# Patient Record
Sex: MALE | Race: WHITE | Employment: OTHER | URBAN - METROPOLITAN AREA
[De-identification: names, ages, dates, MRNs, and addresses within clinical notes are randomized per-mention and may not be internally consistent; named-entity substitution may affect disease eponyms.]

---

## 2020-01-15 ENCOUNTER — NURSE NAVIGATOR (OUTPATIENT)
Dept: OTHER | Age: 60
End: 2020-01-15

## 2020-01-15 NOTE — NURSE NAVIGATOR
Referring Provider: Jam Dugan MD      Lung Cancer Risk Profile:   Age: 62  Gender: Male  Height: 72\"  Weight: 199#    Smoking History:  Smoking Status: current use  # years smokin  # years quit: 0  Packs/day: 1  Pack years: 55    Patient discussed smoking cessation with PCP: Yes, per patient report    Patient participated in shared decision making process with PCP: Unknown    Patient is currently experiencing symptoms: No, per patient report    If yes what symptoms:     Co-Morbidities:      Cancer History:      Additional Risk Factors:        Aunt with lung cancer, exposure to second hand smoke      Patient's smoking history discussed via phone. Patient meets LDCT lung cancer screening criteria.  Call transferred to central scheduling to schedule exam.      CRISTAL SalasN, RN, [address] [address] [facility] Nurse Navigator

## 2020-01-22 ENCOUNTER — HOSPITAL ENCOUNTER (OUTPATIENT)
Dept: CT IMAGING | Age: 60
Discharge: HOME | End: 2020-01-22
Attending: FAMILY MEDICINE
Payer: COMMERCIAL

## 2020-01-22 VITALS — HEIGHT: 72 IN | BODY MASS INDEX: 26.82 KG/M2 | WEIGHT: 198 LBS

## 2020-01-22 DIAGNOSIS — F17.210 NICOTINE DEPENDENCE, CIGARETTES, UNCOMPLICATED: ICD-10-CM

## 2020-01-22 PROCEDURE — G0297 LDCT FOR LUNG CA SCREEN: HCPCS

## 2021-07-13 ENCOUNTER — OFFICE VISIT (OUTPATIENT)
Dept: ORTHOPEDIC SURGERY | Age: 61
End: 2021-07-13
Payer: COMMERCIAL

## 2021-07-13 VITALS — HEIGHT: 72 IN | BODY MASS INDEX: 25.47 KG/M2 | WEIGHT: 188 LBS

## 2021-07-13 DIAGNOSIS — M25.562 LEFT KNEE PAIN, UNSPECIFIED CHRONICITY: Primary | ICD-10-CM

## 2021-07-13 DIAGNOSIS — M17.12 OSTEOARTHRITIS OF LEFT KNEE, UNSPECIFIED OSTEOARTHRITIS TYPE: ICD-10-CM

## 2021-07-13 PROCEDURE — 20611 DRAIN/INJ JOINT/BURSA W/US: CPT | Performed by: ORTHOPAEDIC SURGERY

## 2021-07-13 PROCEDURE — 99203 OFFICE O/P NEW LOW 30 MIN: CPT | Performed by: ORTHOPAEDIC SURGERY

## 2021-07-13 RX ORDER — GUAIFENESIN 100 MG/5ML
81 LIQUID (ML) ORAL DAILY
COMMUNITY

## 2021-07-13 RX ORDER — TRIAMCINOLONE ACETONIDE 40 MG/ML
40 INJECTION, SUSPENSION INTRA-ARTICULAR; INTRAMUSCULAR ONCE
Status: COMPLETED | OUTPATIENT
Start: 2021-07-13 | End: 2021-07-13

## 2021-07-13 RX ORDER — LIDOCAINE HYDROCHLORIDE 10 MG/ML
9 INJECTION INFILTRATION; PERINEURAL ONCE
Status: COMPLETED | OUTPATIENT
Start: 2021-07-13 | End: 2021-07-13

## 2021-07-13 RX ADMIN — LIDOCAINE HYDROCHLORIDE 9 ML: 10 INJECTION INFILTRATION; PERINEURAL at 10:15

## 2021-07-13 RX ADMIN — TRIAMCINOLONE ACETONIDE 40 MG: 40 INJECTION, SUSPENSION INTRA-ARTICULAR; INTRAMUSCULAR at 10:15

## 2021-07-13 NOTE — LETTER
Kelly Blood   1960   [id number]       7/13/2021       I hereby authorize and direct Brooks Jacobsen MD, Vimal Correa, and whomever he may designate as his associate to perform upon myself the following procedure:    Injection of: Kenalog, Supartz, Euflexxa, Orthovisc in the Right/Left ____________________. If any unforeseen condition arises in the course of the procedure, I further authorize him and his associated and/or assistant(s) to do whatever he/she deems advisable. The nature, purpose, benefits, risks, side effects, likelihood of achieving goals, and potential problems that might occur during recuperation, risks for not receiving the proposed care, treatment and services and alternatives of the procedure have been fully explained to me by my physician including, but not limited to:    Swelling, joint pain, skin pigment changes, worsening of condition, and failure to improve. I acknowledge that no guarantee or assurance has been made to me as to the results that may be obtained or the likelihood of success.                 _______________________________________     Signature of patient or authorized representative                [employer] and [facility] fax: [phone]

## 2021-07-13 NOTE — PROGRESS NOTES
Name: Juju Kelsey    : 1960     Service Dept: [address] [place] [address] and Sports Medicine    Patient's Pharmacies:    [id number] [address] [facility], [address] [facility]  [address]  [place] [address]  Phone: [phone] Fax: [phone]       Chief Complaint   Patient presents with    Knee Pain        Visit Vitals  Ht 6' (1.829 m)   Wt 188 lb (85.3 kg)   BMI 25.50 kg/m²      No Known Allergies   Current Outpatient Medications   Medication Sig Dispense Refill    aspirin 81 mg chewable tablet Take 81 mg by mouth daily. There is no problem list on file for this patient. History reviewed. No pertinent family history. Social History     Socioeconomic History    Marital status: SINGLE     Spouse name: Not on file    Number of children: Not on file    Years of education: Not on file    Highest education level: Not on file   Tobacco Use    Smoking status: Current Every Day Smoker    Smokeless tobacco: Never Used   Substance and Sexual Activity    Alcohol use: Yes     Social Determinants of Health     Financial Resource Strain:     Difficulty of Paying Living Expenses:    Food Insecurity:     Worried About Running Out of Food in the Last Year:     [address] in the Last Year:    Transportation Needs:     Lack of Transportation (Medical):  Lack of Transportation (Non-Medical):    Physical Activity:     Days of Exercise per Week:     Minutes of Exercise per Session:    Stress:     Feeling of Stress :    Social Connections:     Frequency of Communication with Friends and Family:     Frequency of Social Gatherings with Friends and Family:     Attends [employer] Services:     Active Member of Clubs or Organizations:     Attends Club or Organization Meetings:     Marital Status:       History reviewed. No pertinent surgical history. History reviewed. No pertinent past medical history.      I have reviewed and agree with [address] and ROS and intake form in chart and the record furthermore I have reviewed prior medical record(s) regarding this patients care during this appointment. Review of Systems:   Patient is a pleasant appearing individual, appropriately dressed, well hydrated, well nourished, who is alert, appropriately oriented for age, and in no acute distress with a normal gait and normal affect who does not appear to be in any significant pain. Physical Exam:  Left Knee -Decrease range of motion with flexion, Knee arc of greater than 50 degrees, Some crepitation, Grossly neurovascularly intact, Good cap refill, No skin lesion, Moderate swelling, No gross instability, Some quadriceps weakness, Kellgren and Rakesh at least grade 3    Right Knee - Full Range of Motion, No crepitation, Grossly neurovascularly intact, Good cap refill, No skin lesion, No swelling, No gross instability, No quadriceps weakness    Procedure Documentation:    I discussed in detail the risks, benefits and complications of an injection which included but are not limited to infection, skin reactions, hot swollen joint, and anaphylaxis with the patient. The patient verbalized understanding and gave informed consent for the injection. The patient's knee was flexed to 90° and the skin prepped using sterile alcohol solution. A sterile needle was inserted into the left knee and the mixture of 9 mL Lidocaine 1%, 1 mL Kenalog 40 mg was injected under sterile technique. The needle was withdrawn and the puncture site sealed with a Band-Aid. Technique: Under sterile conditions a [employer] ultrasound unit with a variable frequency (7.0-14.0 MHz) linear transducer was used to localize the placement of needle into the left knee joint. Findings: Successful needle placement for knee injection. Final images were taken and saved for permanent record. The patient tolerated the injection well.  The patient was instructed to call the office immediately if there is any pain, redness, warmth, fever, or chills. Encounter Diagnoses     ICD-10-CM ICD-9-CM   1. Left knee pain, unspecified chronicity  M25.562 719.46   2. Osteoarthritis of left knee, unspecified osteoarthritis type  M17.12 715.96       HPI:  The patient is here with a chief complaint of left knee pain, throbbing, burning pain, progressively getting worse. Pain is 6/10. X-rays are positive for severe OA. Assessment/Plan:  Plan for left knee cortisone injection today. See the patient back in 2 weeks. If no better, we will consider treatment options and go from there. As part of continued conservative pain management options the patient was advised to utilize Tylenol or OTC NSAIDS as long as it is not medically contraindicated. Return to Office: Follow-up and Dispositions    · Return in about 2 weeks (around 7/27/2021). Administrations This Visit     lidocaine (XYLOCAINE) 10 mg/mL (1 %) injection 9 mL     Admin Date  07/13/2021 Action  Given Dose  9 mL Route  Other Administered By  Sulema Pena LPN          triamcinolone acetonide (KENALOG-40) 40 mg/mL injection 40 mg     Admin Date  07/13/2021 Action  Given Dose  40 mg Route  Intra artICUlar Administered By  Sulema Pena LPN               Scribed by Pepito Berrios LPN as dictated by [facility] CHRIS Gillette MD.  Documentation True and Accepted Brooks Gillette MD

## 2021-07-13 NOTE — PATIENT INSTRUCTIONS
Knee Pain or Injury: Care Instructions  Your Care Instructions     Injuries are a common cause of knee problems. Sudden (acute) injuries may be caused by a direct blow to the knee. They can also be caused by abnormal twisting, bending, or falling on the knee. Pain, bruising, or swelling may be severe, and may start within minutes of the injury. Overuse is another cause of knee pain. Other causes are climbing stairs, kneeling, and other activities that use the knee. Everyday wear and tear, especially as you get older, also can cause knee pain. Rest, along with home treatment, often relieves pain and allows your knee to heal. If you have a serious knee injury, you may need tests and treatment. Follow-up care is a key part of your treatment and safety. Be sure to make and go to all appointments, and call your doctor if you are having problems. It's also a good idea to know your test results and keep a list of the medicines you take. How can you care for yourself at home? · Be safe with medicines. Read and follow all instructions on the label. ? If the doctor gave you a prescription medicine for pain, take it as prescribed. ? If you are not taking a prescription pain medicine, ask your doctor if you can take an over-the-counter medicine. · Rest and protect your knee. Take a break from any activity that may cause pain. · Put ice or a cold pack on your knee for 10 to 20 minutes at a time. Put a thin cloth between the ice and your skin. · Prop up a sore knee on a pillow when you ice it or anytime you sit or lie down for the next 3 days. Try to keep it above the level of your heart. This will help reduce swelling. · If your knee is not swollen, you can put moist heat, a heating pad, or a warm cloth on your knee. · If your doctor recommends an elastic bandage, sleeve, or other type of support for your knee, wear it as directed.   · Follow your doctor's instructions about how much weight you can put on your leg. Use a cane, crutches, or a walker as instructed. · Follow your doctor's instructions about activity during your healing process. If you can do mild exercise, slowly increase your activity. · Reach and stay at a healthy weight. Extra weight can strain the joints, especially the knees and hips, and make the pain worse. Losing even a few pounds may help. When should you call for help? Call 911 anytime you think you may need emergency care. For example, call if:    · You have symptoms of a blood clot in your lung (called a pulmonary embolism). These may include:  ? Sudden chest pain. ? Trouble breathing. ? Coughing up blood. Call your doctor now or seek immediate medical care if:    · You have severe or increasing pain.     · Your leg or foot turns cold or changes color.     · You cannot stand or put weight on your knee.     · Your knee looks twisted or bent out of shape.     · You cannot move your knee.     · You have signs of infection, such as:  ? Increased pain, swelling, warmth, or redness. ? Red streaks leading from the knee. ? Pus draining from a place on your knee. ? A fever.     · You have signs of a blood clot in your leg (called a deep vein thrombosis), such as:  ? Pain in your calf, back of the knee, thigh, or groin. ? Redness and swelling in your leg or groin. Watch closely for changes in your health, and be sure to contact your doctor if:    · You have tingling, weakness, or numbness in your knee.     · You have any new symptoms, such as swelling.     · You have bruises from a knee injury that last longer than 2 weeks.     · You do not get better as expected. Where can you learn more? Go to http://www.gray.com/  Enter K195 in the search box to learn more about \"Knee Pain or Injury: Care Instructions. \"  Current as of: February 26, 2020               Content Version: 12.8  © 5132-6996 [employer].    Care instructions adapted under license by [employer] (which disclaims liability or warranty for this information). If you have questions about a medical condition or this instruction, always ask your healthcare professional. [address] any warranty or liability for your use of this information.

## 2021-07-27 ENCOUNTER — OFFICE VISIT (OUTPATIENT)
Dept: ORTHOPEDIC SURGERY | Age: 61
End: 2021-07-27
Payer: COMMERCIAL

## 2021-07-27 DIAGNOSIS — M17.12 PRIMARY OSTEOARTHRITIS OF LEFT KNEE: Primary | ICD-10-CM

## 2021-07-27 PROCEDURE — 99213 OFFICE O/P EST LOW 20 MIN: CPT | Performed by: ORTHOPAEDIC SURGERY

## 2021-07-27 NOTE — PATIENT INSTRUCTIONS
Knee Arthritis: Care Instructions  Your Care Instructions     Knee arthritis is a breakdown of the cartilage that cushions your knee joint. When the cartilage wears down, your bones rub against each other. This causes pain and stiffness. Knee arthritis tends to get worse with time. Treatment for knee arthritis involves reducing pain, making the leg muscles stronger, and staying at a healthy body weight. The treatment usually does not improve the health of the cartilage, but it can reduce pain and improve how well your knee works. You can take simple measures to protect your knee joints, ease your pain, and help you stay active. Follow-up care is a key part of your treatment and safety. Be sure to make and go to all appointments, and call your doctor if you are having problems. It's also a good idea to know your test results and keep a list of the medicines you take. How can you care for yourself at home? · Know that knee arthritis will cause more pain on some days than on others. · Stay at a healthy weight. Lose weight if you are overweight. When you stand up, the pressure on your knees from every pound of body weight is multiplied four times. So if you lose 10 pounds, you will reduce the pressure on your knees by 40 pounds. · Talk to your doctor or physical therapist about exercises that will help ease joint pain. ? Stretch to help prevent stiffness and to prevent injury before you exercise. You may enjoy gentle forms of yoga to help keep your knee joints and muscles flexible. ? Walk instead of jog.  ? Ride a bike. This makes your thigh muscles stronger and takes pressure off your knee. ? Wear well-fitting and comfortable shoes. ? Exercise in chest-deep water. This can help you exercise longer with less pain. ? Avoid exercises that include squatting or kneeling. They can put a lot of strain on your knees.   ? Talk to your doctor to make sure that the exercise you do is not making the arthritis worse.  · Do not sit for long periods of time. Try to walk once in a while to keep your knee from getting stiff. · Ask your doctor or physical therapist whether shoe inserts may reduce your arthritis pain. · If you can afford it, get new athletic shoes at least every year. This can help reduce the strain on your knees. · Use a device to help you do everyday activities. ? A cane or walking stick can help you keep your balance when you walk. Hold the cane or walking stick in the hand opposite the painful knee. ? If you feel like you may fall when you walk, try using crutches or a front-wheeled walker. These can prevent falls that could cause more damage to your knee. ? A knee brace may help keep your knee stable and prevent pain. ? You also can use other things to make life easier, such as a higher toilet seat and handrails in the bathtub or shower. · Take pain medicines exactly as directed. ? Do not wait until you are in severe pain. You will get better results if you take it sooner. ? If you are not taking a prescription pain medicine, take an over-the-counter medicine such as acetaminophen (Tylenol), ibuprofen (Advil, Motrin), or naproxen (Aleve). Read and follow all instructions on the label. ? Do not take two or more pain medicines at the same time unless the doctor told you to. Many pain medicines have acetaminophen, which is Tylenol. Too much acetaminophen (Tylenol) can be harmful. ? Tell your doctor if you take a blood thinner, have diabetes, or have allergies to shellfish. · Ask your doctor if you might benefit from a shot of steroid medicine into your knee. This may provide pain relief for several months. · Many people take the supplements glucosamine and chondroitin for osteoarthritis. Some people feel they help, but the medical research does not show that they work. Talk to your doctor before you take these supplements. When should you call for help?    Call your doctor now or seek immediate medical care if:    · You have sudden swelling, warmth, or pain in your knee.     · You have knee pain and a fever or rash.     · You have such bad pain that you cannot use your knee. Watch closely for changes in your health, and be sure to contact your doctor if you have any problems. Where can you learn more? Go to http://www.gray.com/  Enter W187 in the search box to learn more about \"Knee Arthritis: Care Instructions. \"  Current as of: August 5, 2020               Content Version: 12.8  © 2006-2021 [employer]. Care instructions adapted under license by [employer] (which disclaims liability or warranty for this information). If you have questions about a medical condition or this instruction, always ask your healthcare professional. [address] any warranty or liability for your use of this information.

## 2021-07-27 NOTE — PROGRESS NOTES
Name: Kelly Blood    : 1960     Service Dept: [address] [place] [address] and Sports Medicine    Patient's Pharmacies:    [id number] [address] [place] [address] - [address]  [address]  [place] [address]  Phone: [phone] Fax: [phone]       Chief Complaint   Patient presents with    Knee Pain        There were no vitals taken for this visit. No Known Allergies   Current Outpatient Medications   Medication Sig Dispense Refill    aspirin 81 mg chewable tablet Take 81 mg by mouth daily. There is no problem list on file for this patient. No family history on file. Social History     Socioeconomic History    Marital status: SINGLE     Spouse name: Not on file    Number of children: Not on file    Years of education: Not on file    Highest education level: Not on file   Tobacco Use    Smoking status: Current Every Day Smoker    Smokeless tobacco: Never Used   Substance and Sexual Activity    Alcohol use: Yes     Social Determinants of Health     Financial Resource Strain:     Difficulty of Paying Living Expenses:    Food Insecurity:     Worried About Running Out of Food in the Last Year:     [address] in the Last Year:    Transportation Needs:     Lack of Transportation (Medical):  Lack of Transportation (Non-Medical):    Physical Activity:     Days of Exercise per Week:     Minutes of Exercise per Session:    Stress:     Feeling of Stress :    Social Connections:     Frequency of Communication with Friends and Family:     Frequency of Social Gatherings with Friends and Family:     Attends Nondenominational Services:     Active Member of Clubs or Organizations:     Attends Club or Organization Meetings:     Marital Status:       No past surgical history on file. No past medical history on file.      I have reviewed and agree with PFSH and ROS and intake form in chart and the record furthermore I have reviewed prior medical record(s) regarding this patients care during this appointment. Review of Systems:   Patient is a pleasant appearing individual, appropriately dressed, well hydrated, well nourished, who is alert, appropriately oriented for age, and in no acute distress with a normal gait and normal affect who does not appear to be in any significant pain. Physical Exam:  Left Knee -Decrease range of motion with flexion, Knee arc of greater than 50 degrees, Some crepitation, Grossly neurovascularly intact, Good cap refill, No skin lesion, Moderate swelling, No gross instability, Some quadriceps weakness, Kellgren and Rakesh at least grade 3    Right Knee - Full Range of Motion, No crepitation, Grossly neurovascularly intact, Good cap refill, No skin lesion, No swelling, No gross instability, No quadriceps weakness     Encounter Diagnoses     ICD-10-CM ICD-9-CM   1. Primary osteoarthritis of left knee  M17.12 715.16       HPI:  The patient is here with a chief complaint of left knee pain, throbbing, burning pain, post-cortisone injection diagnosis severe OA. Pain is 3/10. Assessment/Plan:  Plan at this point is for left total knee replacement if he decides, but for right now  I will see him back as needed. He will need general medical clearance if he decides and go from there. As part of continued conservative pain management options the patient was advised to utilize Tylenol or OTC NSAIDS as long as it is not medically contraindicated. Return to Office: Follow-up and Dispositions    · Return for PRN. Scribed by Vignesh Aguilar as dictated by Noe Rudolph. Efrem Davis MD.  Documentation True and Accepted Brooks Davis MD

## 2021-08-09 ENCOUNTER — TELEPHONE (OUTPATIENT)
Dept: ORTHOPEDIC SURGERY | Age: 61
End: 2021-08-09

## 2021-08-09 DIAGNOSIS — M17.12 OSTEOARTHRITIS OF LEFT KNEE, UNSPECIFIED OSTEOARTHRITIS TYPE: ICD-10-CM

## 2021-08-09 DIAGNOSIS — M25.562 LEFT KNEE PAIN, UNSPECIFIED CHRONICITY: Primary | ICD-10-CM

## 2021-08-09 DIAGNOSIS — M25.562 LEFT KNEE PAIN, UNSPECIFIED CHRONICITY: ICD-10-CM

## 2021-08-09 NOTE — TELEPHONE ENCOUNTER
Patient would like to schedule:     Plan at this point is for left total knee replacement if he decides, but for right now  I will see him back as needed. He will need general medical clearance if he decides and go from there.

## 2022-02-28 ENCOUNTER — HOSPITAL ENCOUNTER (OUTPATIENT)
Dept: LAB | Age: 62
Discharge: HOME | End: 2022-02-28

## 2022-11-23 ENCOUNTER — HOSPITAL ENCOUNTER (OUTPATIENT)
Dept: LAB | Age: 62
Discharge: HOME | End: 2022-11-23

## 2022-11-23 ENCOUNTER — HOSPITAL ENCOUNTER (OUTPATIENT)
Dept: NON INVASIVE DIAGNOSTICS | Age: 62
Discharge: HOME | End: 2022-11-23
Payer: COMMERCIAL

## 2022-11-23 ENCOUNTER — TRANSCRIBE ORDER (OUTPATIENT)
Dept: REGISTRATION | Age: 62
End: 2022-11-23

## 2022-11-23 DIAGNOSIS — Z01.812 BLOOD TESTS PRIOR TO TREATMENT OR PROCEDURE: ICD-10-CM

## 2022-11-23 DIAGNOSIS — M17.12 DEGENERATIVE ARTHRITIS OF LEFT KNEE: ICD-10-CM

## 2022-11-23 DIAGNOSIS — M17.12 DEGENERATIVE ARTHRITIS OF LEFT KNEE: Primary | ICD-10-CM

## 2022-11-23 LAB
ATRIAL RATE: 77 BPM
CALCULATED P AXIS, ECG09: 75 DEGREES
CALCULATED R AXIS, ECG10: 68 DEGREES
CALCULATED T AXIS, ECG11: -115 DEGREES
DIAGNOSIS, 93000: NORMAL
P-R INTERVAL, ECG05: 126 MS
Q-T INTERVAL, ECG07: 376 MS
QRS DURATION, ECG06: 80 MS
QTC CALCULATION (BEZET), ECG08: 425 MS
VENTRICULAR RATE, ECG03: 77 BPM
XX-LABCORP SPECIMEN COL,LCBCF: NORMAL

## 2022-11-23 PROCEDURE — 93005 ELECTROCARDIOGRAM TRACING: CPT

## 2022-11-23 PROCEDURE — 99001 SPECIMEN HANDLING PT-LAB: CPT

## 2023-01-03 ENCOUNTER — HOSPITAL ENCOUNTER (OUTPATIENT)
Dept: PHYSICAL THERAPY | Age: 63
End: 2023-01-03
Payer: COMMERCIAL

## 2023-01-03 PROCEDURE — 97162 PT EVAL MOD COMPLEX 30 MIN: CPT

## 2023-01-03 NOTE — THERAPY EVALUATION
PT KNEE EVAL AND DAILY NOTE 10-18    Patient Name: Payal Guillermo  Date:1/3/2023  : 1960  [x]  Patient  Verified  Payor: /    In time: 7048  Out time: 0845  Total Treatment Time (min): 35  Visit #: 1    Medicare/BCBS Only   Total Timed Codes (min):  0 1:1 Treatment Time:  35     Treatment Area: Status post total left knee replacement [Z96.652]    SUBJECTIVE  Pain Level (0-10 scale): Current: 2/10; At worst: 5/10   []Sharp  [x]Dull  [x]Achy []Burning []Throbbing []N&T []Other:  []constant [x]intermittent []improving []worsening []no change since onset      Any medication changes, allergies to medications, adverse drug reactions, diagnosis change, or new procedure performed?: [x] No    [] Yes (see summary sheet for update)  Subjective:     Pt is a 66 y/o male who presents to physical therapy s/p L TKA on 22. Pt received in waiting area ambulating with no AD with antalgic gait pattern of decreased gait speed, decreased estephania, and decreased R step length. Prior to surgery pt was independent with mobility and used no AD for ambulation. Pt presents today with decreased ROM, decreased strength, increased pain, and decreased ability to ambulate with normalized gait pattern. Pt could benefit from skilled PT services to address the above impairments and to improve Pt ability to participate in functional activities of choice.      Date of Surgery: 22  Weight bearing status: WBAT    Patient Goals: Reduce pain, restore mobility  Previous Treatment/Compliance: home health PT for 2-3 weeks following surgery  Barriers: [x]pain []financial []time []transportation []other  Motivation: Good  Substance use: []Alcohol []Tobacco []other:   LEFS Score: 56.2  FOTO: 65  30STS: 10 reps      OBJECTIVE/EXAMINATION  Posture: [] Varus    [] Valgus    [] Recurvatum        [] Tibial Torsion    [] Foot Supination    [] Foot Pronation    Describe:    Gait:  [] Normal    [] Abnormal    [x] Antalgic    [] NWB    Device: none    Describe: decreased gait speed, decreased estephania, and decreased R step length    ROM / Strength  [] Unable to assess                  AROM                      PROM                   Strength (1-5)    Left Right Left Right Left Right   Hip Flexion WNL WNL   4+/5 4+/5    Extension WNL WNL   4+/5 5/5    Abduction WNL WNL   4+/5 5/5    Adduction WNL WNL   5/5 5/5   Knee Flexion 108  110  4/5 5/5    Extension 0 0   4+/5 5/5   Ankle Plantarflexion WNL WNL   4+/5 5/5    Dorsiflexion WNL WNL   4+/5 5/5       Flexibility: [] Unable to assess at this time  Hamstrings:    (L) Tightness= [] WNL   [] Min   [] Mod   [] Severe    (R) Tightness= [] WNL   [] Min   [] Mod   [] Severe  Quadriceps:    (L) Tightness= [] WNL   [] Min   [x] Mod   [] Severe    (R) Tightness= [] WNL   [] Min   [] Mod   [] Severe  Gastroc:      (L) Tightness= [] WNL   [] Min   [] Mod   [] Severe    (R) Tightness= [] WNL   [] Min   [] Mod   [] Severe  Other:    Palpation:  TTP at distal quadriceps    Optional Tests:  Patellar Positioning (Static)   []L []R Normal []L []R Lateral   []L []R Xochitl Genera      []L []R Medial   []L []R [employer] AT [place]    Patellar Tracking   []L []R Glide (Lat)   []L []R Tilt (Lat)     []L []R Glide (Med)  []L []R Tilt (Med)      []L []R Tile (Inf)     Patellar Mobility   []L []R Hypermobile []L []R Hypomobile         Girth Measurements:   Mid patellar (cm) 10 cm proximal to superior border of patella (cm) 10 cm inferior to inferior pole of patella (cm)            Special testing:  Lachmans  [] Neg    [] Pos Posterior Drawer [] Neg    [] Pos  Pivot Shift  [] Neg    [] Pos Posterior Sag  [] Neg    [] Pos  CHRIS   [] Neg    [] Pos Brayden's Test [] Neg    [] Pos  ALRI   [] Neg    [] Pos Squat   [] Neg    [] Pos  Valgus@ 0 Degrees [] Neg    [] Pos Linda [] Neg    [] Pos  Valgus@ 30 Degrees [] Neg    [] Pos Patellar Apprehension [] Neg    [] Pos  Varus@ 0 Degrees [] Neg    [] Pos Montoya's Compression [] Neg    [] Pos  Varus@ 30 Degrees [] Neg    [] Pos Ely's Test  [] Neg    [] Pos  Apley's Compression [] Neg    [] Pos Chente's Test  [] Neg    [] Pos  Apley's Distraction [] Neg    [] Pos Stroke Test  [] Neg    [] Pos   Anterior Drawer [] Neg    [] Pos Fluctuation Test [] Neg    [] Pos  Other:                  [] Neg    [] Pos                 Other tests/comments:  Mobility: independent  Self Care: independent        Modality rationale:    Min Type Additional Details    [] Estim:  []Unatt       []IFC  []Premod                        []Other:  []w/ice   []w/heat  Position:  Location:    [] Estim: []Att    []TENS instruct  []NMES                    []Other:  []w/US   []w/ice   []w/heat  Position:  Location:    []  Traction: [] Cervical       []Lumbar                       [] Prone          []Supine                       []Intermittent   []Continuous Lbs:  [] before manual  [] after manual    []  Ultrasound: []Continuous   [] Pulsed                           []1MHz   []3MHz Location:  W/cm2:    []  Iontophoresis with dexamethasone         Location: [] Take home patch   [] In clinic    []  Ice     []  heat  []  Ice massage  []  Laser   []  Anodyne Position:  Location:    []  Laser with stim  []  Other: Position:  Location:    []  Vasopneumatic Device Pressure:       [] lo [] med [] hi   Temperature: [] lo [] med [] hi   [] Skin assessment post-treatment:  []intact []redness- no adverse reaction    []redness - adverse reaction:     35 min [x]Eval                  []Re-Eval             With   [] TE   [] TA   [] neuro   [] other: Patient Education: [x] Review HEP    [] Progressed/Changed HEP based on:   [] positioning   [] body mechanics   [] transfers   [] heat/ice application    [] other:        Pain Level (0-10 scale) post treatment: 2/10      Assessment:    [x]  See Plan of Care  []  See progress note/recertification  []  See Discharge Summary           Yolanda Sanz PT, DPT  1/3/2023  8:11 AM

## 2023-01-03 NOTE — THERAPY EVALUATION
[facility] Dorcas Montalvo, [address] [facility], [address] [place] [address]  VIKY Mus[address]  [address]  FOR PHYSICAL THERAPY SERVICES  Patient Name: Es Fair : 1960   Medical   Diagnosis: Status post total left knee replacement [Z96.652] Treatment Diagnosis: R26.2; M62.81   Onset Date: 22     Referral Source: Karin Pastor MD [facility], [place], operated by [facility]): 1/3/2023   Prior Hospitalization: See medical history Provider #: [id number]   Prior Level of Function: Independent   Comorbidities: L TKA   Medications: Verified on Patient Summary List   The Plan of Care and following information is based on the information from the initial evaluation.   ==========================================================================================  Assessment / Functional Analysis:    Pt is a 66 y/o male who presents to physical therapy s/p L TKA on 22. Pt received in waiting area ambulating with no AD with antalgic gait pattern of decreased gait speed, decreased estephania, and decreased R step length. Prior to surgery pt was independent with mobility and used no AD for ambulation. Pt presents today with decreased ROM, decreased strength, increased pain, and decreased ability to ambulate with normalized gait pattern.  Pt could benefit from skilled PT services to address the above impairments and to improve Pt ability to participate in functional activities of choice.     ==========================================================================================  Eval Complexity: History: MEDIUM  Complexity : 1-2 comorbidities / personal factors will impact the outcome/ POC Exam:LOW Complexity : 1-2 Standardized tests and measures addressing body structure, function, activity limitation and / or participation in recreation  Presentation: LOW Complexity : Stable, uncomplicated  Clinical Decision Making:MEDIUM Complexity : FOTO score of 26-74Overall Complexity:MEDIUM    Problem List: pain affecting function, decrease ROM, decrease strength, edema affecting function, impaired gait/ balance, decrease ADL/ functional abilitiies, decrease activity tolerance, decrease flexibility/ joint mobility, and decrease transfer abilities   Treatment Plan may include any combination of the following: Therapeutic exercise, Neuromuscular reeducation, Manual therapy, Therapeutic activity, Self care/home management, Electric stim unattended , Vasopneumatic device, and Gait training  Patient / Family readiness to learn indicated by: asking questions, trying to perform skills, and interest  Persons(s) to be included in education: patient (P)  Barriers to Learning/Limitations: None      Patient self reported health status: good  Rehabilitation Potential: excellent    Objective Measures:  LEFS Score: 56.2  FOTO: 65  30STS: 10 reps    ROM / Strength  [] Unable to assess                  AROM                      PROM                   Strength (1-5)      Left Right Left Right Left Right   Hip Flexion WNL WNL     4+/5 4+/5     Extension WNL WNL     4+/5 5/5     Abduction WNL WNL     4+/5 5/5     Adduction WNL WNL     5/5 5/5   Knee Flexion 108 WNL  110   4/5 5/5     Extension 0 0     4+/5 5/5   Ankle Plantarflexion WNL WNL     4+/5 5/5     Dorsiflexion WNL WNL     4+/5 5/5      Short Term Goals: 3 weeks  Patient will report the knowledge of 3 exercises that can be used to help reduce symptoms to be able to ind reduce symptoms while at home. Patient will demonstrate a 1/2 grade improvement in LLE MMT to be able to better ambulate with a normalized gait without pain. Patient will demonstrate L knee PROM of 0-115 deg to facilitate increased ability to ascend/descend stairs. Patient will increase LEFS > 10 points to facilitate increased ability to perform functional activities of choice.     Long Term Goals: 6 weeks  Patient will demonstrate a 2 grade improvement in LLE MMT to be able to better ambulate with a normalized gait without pain. Patient will demonstrate L knee AROM of 0-125  deg or greater to be able to return to normal ambulation on level and unlevel surfaces. Patient will demonstrate the ability to ambulate > 500 feet without an AD without evidence of antalgia to be able to return to an ind walking program following discharge. Patient will increase LEFS > 20 points to facilitate increased ability to perform leisure activities of choice. Frequency / Duration: Patient to be seen  2  times per week for 6  weeks:  Patient / Caregiver education and instruction: self care, activity modification, brace/ splint application, and exercises    Therapist Signature: Amber Garber PT, DPT Date: 8/0/1581   Certification Period: 1/3/23 - 4/3/23 Time: 9:20 AM   ===========================================================================================  I certify that the above Physical Therapy Services are being furnished while the patient is under my care. I agree with the treatment plan and certify that this therapy is necessary. Physician Signature:        Date:       Time:     Please sign and return to [facility] [facility] or you may fax the signed copy to [phone]. Please call [phone] if more information required. Thank you.

## 2023-01-09 ENCOUNTER — APPOINTMENT (OUTPATIENT)
Dept: PHYSICAL THERAPY | Age: 63
End: 2023-01-09
Payer: COMMERCIAL

## 2023-01-09 PROCEDURE — 97110 THERAPEUTIC EXERCISES: CPT

## 2023-01-09 NOTE — PROGRESS NOTES
PT DAILY TREATMENT NOTE     Patient Name: Junito Prow  Date:2023  : 1960  [x]  Patient  Verified  Payor: Lianet Dukedolores / Plan: [address] [place] [address] [employer] / Product Type: HMO /    In time:0800  Out BHDR:[id number]  Total Treatment Time (min): 48  Total Timed Codes (min): 48  1:1 Treatment Time (min): 48   Visit #: 2    Treatment Area: Status post total left knee replacement [Z96.652]    SUBJECTIVE  Pt reports minor soreness and stiffness in L knee. Pain Level (0-10 scale): 0    Any medication changes, allergies to medications, adverse drug reactions, diagnosis change, or new procedure performed?: [x] No    [] Yes (see summary sheet for update)    OBJECTIVE  Modality rationale: Declined   Min Type Additional Details    [] Estim: []Att   []Unatt  []TENS instruct                 []IFC  []Premod []NMES                       []Other:  []w/US   []w/ice   []w/heat  Position:  Location:    []  Traction: [] Cervical       []Lumbar                       [] Prone          []Supine                       []Intermittent   []Continuous Lbs:  [] before manual  [] after manual    []  Ultrasound: []Continuous   [] Pulsed                           []1MHz   []3MHz Location:  W/cm2:    []  Ice     []  heat  []  Ice massage Position:  Location:    []  Vasopneumatic Device Pressure: [] lo [] med [] hi   Temp: [] lo [] med [] hi   [] Skin assessment post-treatment:  []intact []redness- no adverse reaction       []redness - adverse reaction:     48 min Therapeutic Exercise:  [] See flow sheet :   Rationale: increase ROM, increase strength, improve coordination, improve balance, and increase proprioception to improve the patients ability to return to PLOF with full AROM and strength.            With TE  TA   NR  GT   Misc Patient Education: [x] Review HEP    [] Progressed/Changed HEP based on:   [] positioning   [] body mechanics   [] transfers   [] heat/ice application        Pain Level (0-10 scale) post treatment: 0    ASSESSMENT/Changes in Function: Initiated POC working to address range of motion and strength deficits in L knee. Session began with an active warm up via stationary bicycle. Pt completed full revolutions on first attempt. B LE stretches followed with demonstration provided to ensure proper form. Focused on strengthening quadrecip with CKC and [place] exercises. Introduced gym equipment which patient completed without issue. All modalities declined. Plan to continue POC progressing as able. Patient will continue to benefit from skilled PT services to modify and progress therapeutic interventions, address functional mobility deficits, address ROM deficits, address strength deficits, analyze and address soft tissue restrictions, analyze and cue movement patterns, analyze and modify body mechanics/ergonomics, assess and modify postural abnormalities, and address imbalance/dizziness to attain remaining goals.      [x]  See Plan of Care  []  See progress note/recertification  []  See Discharge Summary         PLAN  []  Upgrade activities as tolerated     [x]  Continue plan of care  []  Update interventions per flow sheet       []  Discharge due to:_  []  Other:    Vickey Gongora  1/9/2023  8:53 AM

## 2023-01-11 ENCOUNTER — APPOINTMENT (OUTPATIENT)
Dept: PHYSICAL THERAPY | Age: 63
End: 2023-01-11
Payer: COMMERCIAL

## 2023-01-11 PROCEDURE — 97110 THERAPEUTIC EXERCISES: CPT

## 2023-01-11 NOTE — PROGRESS NOTES
PT DAILY TREATMENT NOTE     Patient Name: Bia Carrizales  Date:2023  : 1960  [x]  Patient  Verified  Payor: Claire Mueller / Plan: [employer] / Product Type: HMO /    In time:0800  Out KURA:[id number]  Total Treatment Time (min): 42  Total Timed Codes (min): 42  1:1 Treatment Time (min): 42   Visit #: 3    Treatment Area: Status post total left knee replacement [Z96.652]    SUBJECTIVE  Pt reports stiffness in L quadriceps. Denies any pain. Pain Level (0-10 scale): 0    Any medication changes, allergies to medications, adverse drug reactions, diagnosis change, or new procedure performed?: [x] No    [] Yes (see summary sheet for update)    OBJECTIVE  Modality rationale: Declined   Min Type Additional Details    [] Estim: []Att   []Unatt  []TENS instruct                 []IFC  []Premod []NMES                       []Other:  []w/US   []w/ice   []w/heat  Position:  Location:    []  Traction: [] Cervical       []Lumbar                       [] Prone          []Supine                       []Intermittent   []Continuous Lbs:  [] before manual  [] after manual    []  Ultrasound: []Continuous   [] Pulsed                           []1MHz   []3MHz Location:  W/cm2:    []  Ice     []  heat  []  Ice massage Position:  Location:    []  Vasopneumatic Device Pressure: [] lo [] med [] hi   Temp: [] lo [] med [] hi   [] Skin assessment post-treatment:  []intact []redness- no adverse reaction       []redness - adverse reaction:     42 min Therapeutic Exercise:  [] See flow sheet :   Rationale: increase ROM, increase strength, improve coordination, improve balance, and increase proprioception to improve the patients ability to obtain full AROM and strength.             With TE  TA   NR  GT   Misc Patient Education: [x] Review HEP    [] Progressed/Changed HEP based on:   [] positioning   [] body mechanics   [] transfers   [] heat/ice application        Pain Level (0-10 scale) post treatment: 0    ASSESSMENT/Changes in Function: Session began with an active warm up followed by B LE stretches. Continued to address range of motion and strength deficits in L knee with a strength circuit. Introduced straight leg-raise with resistance. Pt was able to complete with no extensor lag present. Plan to incorporate balance training next visit. Plan to continue POC progressing within protocol. Patient will continue to benefit from skilled PT services to modify and progress therapeutic interventions, address functional mobility deficits, address ROM deficits, address strength deficits, analyze and address soft tissue restrictions, analyze and cue movement patterns, analyze and modify body mechanics/ergonomics, assess and modify postural abnormalities, and address imbalance/dizziness to attain remaining goals.      [x]  See Plan of Care  []  See progress note/recertification  []  See Discharge Summary         PLAN  []  Upgrade activities as tolerated     [x]  Continue plan of care  []  Update interventions per flow sheet       []  Discharge due to:_  []  Other:    Vickey Bobo  1/11/2023  8:47 AM

## 2023-01-16 ENCOUNTER — HOSPITAL ENCOUNTER (OUTPATIENT)
Dept: PHYSICAL THERAPY | Age: 63
Discharge: HOME | End: 2023-01-16
Payer: COMMERCIAL

## 2023-01-16 PROCEDURE — 97530 THERAPEUTIC ACTIVITIES: CPT

## 2023-01-16 PROCEDURE — 97110 THERAPEUTIC EXERCISES: CPT

## 2023-01-16 NOTE — PROGRESS NOTES
PT DAILY TREATMENT NOTE 8    Patient Name: Tri Hernadez  Date:2023  : 1960  [x]  Patient  Verified  Payor: Florentino Restrepo / Plan: [address] [place] [address] [employer] / Product Type: HMO /    In time: 8764  Out time: 935  Total Treatment Time (min): 57  Total Timed Codes (min): 57  1:1 Treatment Time (min): 57   Visit # 4      Treatment Area: Status post total left knee replacement [Z96.652]    SUBJECTIVE  Pt reports slight increase in L knee pain that he attributes to increased tightness from sitting a lot over the weekend.    Pain Level (0-10 scale): 0/10  Any medication changes, allergies to medications, adverse drug reactions, diagnosis change, or new procedure performed?: [x] No    [] Yes (see summary sheet for update)        OBJECTIVE  Modality rationale:    Min Type Additional Details    [] Estim: []Att   []Unatt  []TENS instruct                 []IFC  []Premod []NMES                       []Other:  []w/US   []w/ice   []w/heat  Position:  Location:    []  Traction: [] Cervical       []Lumbar                       [] Prone          []Supine                       []Intermittent   []Continuous Lbs:  [] before manual  [] after manual    []  Ultrasound: []Continuous   [] Pulsed                           []1MHz   []3MHz Location:  W/cm2:         []  Ice     []  heat  []  Ice massage Position:  Location:    []  Vasopneumatic Device Pressure: [] lo [] med [] hi   Temp: [] lo [] med [] hi   [] Skin assessment post-treatment:  []intact []redness- no adverse reaction       []redness - adverse reaction:       41 min Therapeutic Exercise:  [x] See flow sheet :   Rationale: increase ROM and increase strength to improve the patients ability to ambulate    16 min Therapeutic Activity:  []  See flow sheet :   Rationale: increase coordination to improve pt ability to transfer         With TE Patient Education: [x] Review HEP    [] Progressed/Changed HEP based on:   [] positioning   [] body mechanics   [] transfers   [] heat/ice application          Pain Level (0-10 scale) post treatment: 0/10    ASSESSMENT/Changes in Function:   Session initiated on recumbent bike followed by self stretching and PRE. Today's Tx session emphasized exercises per POC to increase L knee ROM and strength with Pt tolerating Tx with no adverse effects. Pt demonstrates increased activity tolerance when performing exercises during today's Tx session. Increased repetitions and resistance with SLR, leg press, and leg curl. PT will continue per POC, progressing as tolerated. Patient will continue to benefit from skilled PT services to modify and progress therapeutic interventions, address functional mobility deficits, address ROM deficits, address strength deficits, analyze and address soft tissue restrictions, analyze and cue movement patterns, analyze and modify body mechanics/ergonomics, assess and modify postural abnormalities, and address imbalance/dizziness to attain remaining goals.      []  See Plan of Care  []  See progress note/recertification  []  See Discharge Summary             PLAN  [x]  Upgrade activities as tolerated     [x]  Continue plan of care  [x]  Update interventions per flow sheet       []  Discharge due to:_  []  Other:_      Rian Damon PT, DPT 1/16/2023  10:19 AM

## 2023-01-18 ENCOUNTER — HOSPITAL ENCOUNTER (OUTPATIENT)
Dept: PHYSICAL THERAPY | Age: 63
Discharge: HOME | End: 2023-01-18
Payer: COMMERCIAL

## 2023-01-18 PROCEDURE — 97110 THERAPEUTIC EXERCISES: CPT

## 2023-01-18 NOTE — PROGRESS NOTES
PT DAILY TREATMENT NOTE     Patient Name: Smith Daft  Date:2023  : 1960  [x]  Patient  Verified  Payor: Julito Henrys / Plan: [employer] / Product Type: HMO /    In time:801  Out time:901  Total Treatment Time (min): 60  Total Timed Codes (min): 60  1:1 Treatment Time (min): 60   Visit #: 5     Treatment Area: Status post total left knee replacement [Z96.652]    SUBJECTIVE  Pt reports no issues concerning L knee. He says, \"my R knee is causing me more pain than my L. \"    Pain Level (0-10 scale): 0    Any medication changes, allergies to medications, adverse drug reactions, diagnosis change, or new procedure performed?: [x] No    [] Yes (see summary sheet for update)    OBJECTIVE  Modality rationale: Declined   Min Type Additional Details    [] Estim: []Att   []Unatt  []TENS instruct                 []IFC  []Premod []NMES                       []Other:  []w/US   []w/ice   []w/heat  Position:  Location:    []  Traction: [] Cervical       []Lumbar                       [] Prone          []Supine                       []Intermittent   []Continuous Lbs:  [] before manual  [] after manual    []  Ultrasound: []Continuous   [] Pulsed                           []1MHz   []3MHz Location:  W/cm2:    []  Ice     []  heat  []  Ice massage Position:  Location:    []  Vasopneumatic Device Pressure: [] lo [] med [] hi   Temp: [] lo [] med [] hi   [] Skin assessment post-treatment:  []intact []redness- no adverse reaction       []redness - adverse reaction:     60 min Therapeutic Exercise:  [] See flow sheet :   Rationale: increase ROM, increase strength, improve coordination, improve balance, and increase proprioception to improve the patients ability to achieve full AROM and strength allowing for ease with work and daily activities.             With TE  TA   NR  GT   Misc Patient Education: [x] Review HEP    [] Progressed/Changed HEP based on:   [] positioning   [] body mechanics   [] transfers   [] heat/ice application        Pain Level (0-10 scale) post treatment: 0    ASSESSMENT/Changes in Function: Session began with increased resistance on stationary bicycle followed by standing stretches. Continued with POC working to address range of motion and strength deficits in L knee. Revisited step downs to increase eccentric control and L LE stability. Introduced unilateral and bilateral strength along circuit with no adverse effects. Balance was challenged with SLS. UE support was used as needed, but decreased with repetition. All modalities declined. Plan to continue POC progressing as able. Patient will continue to benefit from skilled PT services to modify and progress therapeutic interventions, address functional mobility deficits, address ROM deficits, address strength deficits, analyze and address soft tissue restrictions, analyze and cue movement patterns, analyze and modify body mechanics/ergonomics, assess and modify postural abnormalities, and address imbalance/dizziness to attain remaining goals.      [x]  See Plan of Care  []  See progress note/recertification  []  See Discharge Summary         PLAN  []  Upgrade activities as tolerated     [x]  Continue plan of care  []  Update interventions per flow sheet       []  Discharge due to:_  []  Other:    Vickey Gongora  1/18/2023  9:06 AM

## 2023-01-25 ENCOUNTER — HOSPITAL ENCOUNTER (OUTPATIENT)
Dept: PHYSICAL THERAPY | Age: 63
Discharge: HOME | End: 2023-01-25
Payer: COMMERCIAL

## 2023-01-25 PROCEDURE — 97110 THERAPEUTIC EXERCISES: CPT

## 2023-01-25 NOTE — PROGRESS NOTES
PT DAILY TREATMENT NOTE     Patient Name: Radha Vargas  Date:2023  : 1960  [x]  Patient  Verified  Payor: Keyur Donaldson / Plan: [place] [employer] / Product Type: HMO /    In time:0800  Out time:09  Total Treatment Time (min): 62  Total Timed Codes (min): 62  1:1 Treatment Time (min): 62   Visit #: 6     Treatment Area: Status post total left knee replacement [Z96.652]    SUBJECTIVE  Pt reports no issues concerning L knee. Pain Level (0-10 scale): 0    Any medication changes, allergies to medications, adverse drug reactions, diagnosis change, or new procedure performed?: [x] No    [] Yes (see summary sheet for update)    OBJECTIVE  Modality rationale: Declined   Min Type Additional Details    [] Estim: []Att   []Unatt  []TENS instruct                 []IFC  []Premod []NMES                       []Other:  []w/US   []w/ice   []w/heat  Position:  Location:    []  Traction: [] Cervical       []Lumbar                       [] Prone          []Supine                       []Intermittent   []Continuous Lbs:  [] before manual  [] after manual    []  Ultrasound: []Continuous   [] Pulsed                           []1MHz   []3MHz Location:  W/cm2:    []  Ice     []  heat  []  Ice massage Position:  Location:    []  Vasopneumatic Device Pressure: [] lo [] med [] hi   Temp: [] lo [] med [] hi   [] Skin assessment post-treatment:  []intact []redness- no adverse reaction       []redness - adverse reaction:     62 min Therapeutic Exercise:  [] See flow sheet :   Rationale: increase ROM, increase strength, improve coordination, improve balance, and increase proprioception to improve the patients ability to complete work and leisure activities pain free.       With TE  TA   NR  GT   Misc Patient Education: [x] Review HEP    [] Progressed/Changed HEP based on:   [] positioning   [] body mechanics   [] transfers   [] heat/ice application        Pain Level (0-10 scale) post treatment: 0    ASSESSMENT/Changes in Function: Continued with exercises per flowsheet working to achieve short term goals to  report the knowledge of 3 exercises that can be used to help reduce symptoms to be able to ind reduce symptoms while at home, to demonstrate a 1/2 grade improvement in LLE MMT to be able to better ambulate with a normalized gait without pain, to demonstrate L knee PROM of 0-115 deg to facilitate increased ability to ascend/descend stairs, and to increase LEFS > 10 points to facilitate increased ability to perform functional activities of choice. Introduced sports art hip abduction/adduction with no adverse effects. Revisited SLS with patient showing improved balance. Resistance increased on various exercises showing increased strength and stability. Plan to continue POC as patient nears discharge. Patient will continue to benefit from skilled PT services to modify and progress therapeutic interventions, address functional mobility deficits, address ROM deficits, address strength deficits, analyze and address soft tissue restrictions, analyze and cue movement patterns, analyze and modify body mechanics/ergonomics, assess and modify postural abnormalities, and address imbalance/dizziness to attain remaining goals.      [x]  See Plan of Care  []  See progress note/recertification  []  See Discharge Summary         PLAN  []  Upgrade activities as tolerated     [x]  Continue plan of care  []  Update interventions per flow sheet       []  Discharge due to:_  []  Other:    Vickey Chicas  1/25/2023  9:06 AM

## 2023-01-27 ENCOUNTER — APPOINTMENT (OUTPATIENT)
Dept: PHYSICAL THERAPY | Age: 63
End: 2023-01-27
Payer: COMMERCIAL

## 2023-01-30 ENCOUNTER — HOSPITAL ENCOUNTER (OUTPATIENT)
Dept: PHYSICAL THERAPY | Age: 63
End: 2023-01-30
Payer: COMMERCIAL

## 2023-01-30 PROCEDURE — 97110 THERAPEUTIC EXERCISES: CPT

## 2023-01-30 PROCEDURE — 97164 PT RE-EVAL EST PLAN CARE: CPT

## 2023-01-30 NOTE — PROGRESS NOTES
PT DAILY TREATMENT NOTE 8-14    Patient Name: Raudel Mattson  Date:2023  : 1960  [x]  Patient  Verified  Payor: Radha Davies / Plan: [place] [employer] / Product Type: HMO /    In time: 497  Out time: 0850  Total Treatment Time (min): 43  Total Timed Codes (min): 28  1:1 Treatment Time (min): 43   Visit # 7      Treatment Area: Status post total left knee replacement [Z96.652]    SUBJECTIVE  Pt reports no L knee pain today. He is back to work and compliant with HEP. He states that he is ready for DC today as appropriate.   Pain Level (0-10 scale): 0/10  Any medication changes, allergies to medications, adverse drug reactions, diagnosis change, or new procedure performed?: [x] No    [] Yes (see summary sheet for update)        OBJECTIVE  Modality rationale:    Min Type Additional Details    [] Estim: []Att   []Unatt  []TENS instruct                 []IFC  []Premod []NMES                       []Other:  []w/US   []w/ice   []w/heat  Position:  Location:    []  Traction: [] Cervical       []Lumbar                       [] Prone          []Supine                       []Intermittent   []Continuous Lbs:  [] before manual  [] after manual    []  Ultrasound: []Continuous   [] Pulsed                           []1MHz   []3MHz Location:  W/cm2:         []  Ice     []  heat  []  Ice massage Position:  Location:    []  Vasopneumatic Device Pressure: [] lo [] med [] hi   Temp: [] lo [] med [] hi   [] Skin assessment post-treatment:  []intact []redness- no adverse reaction       []redness - adverse reaction:       28 min Therapeutic Exercise:  [x] See flow sheet :   Rationale: increase ROM and increase strength to improve the patients ability to ambulate    15 min PT Reassessment              With TE Patient Education: [x] Review HEP    [] Progressed/Changed HEP based on:   [] positioning   [] body mechanics   [] transfers   [] heat/ice application          Pain Level (0-10 scale) post treatment: 0/10    ASSESSMENT/Changes in Function:   Pt participates in PT reassessment today to assess pt progress in therapy this reporting period. Session initiated on recumbent bike followed by self-stretching and PRE. Today's Tx session emphasized exercises per POC to increased L knee ROM and strength with Pt tolerating Tx with no adverse effects. Pt demonstrates increased activity tolerance when performing exercises during today's Tx session. Pt HEP updated and pt was educated on exercise dosing, DOMS, pain science, and safety awareness. Pt has met or is progressing toward their goals, they have improved functional mobility, and they are independent with their HEP. He is DC after today's treatment.          []  See Plan of Care  []  See progress note/recertification  [x]  See Discharge Summary             PLAN  []  Upgrade activities as tolerated     []  Continue plan of care  []  Update interventions per flow sheet       [x]  Discharge due to: Met/progressing toward goals  []  Other:_      Galo Hernandez, PT, DPT 1/30/2023  10:29 AM

## 2023-01-30 NOTE — THERAPY DISCHARGE
[address] [place] [address] PHYSICAL THERAPY  [address] [place] [address] Dr GALE, [facility], [address] * Phone: [phone] * Fax: [phone]  DISCHARGE SUMMARY FOR PHYSICAL THERAPY            Patient Name: Shruti Doshi : 1960   Treatment/Medical Diagnosis: Status post total left knee replacement [Z96.652]   Onset Date: 22    Referral Source: Sergio Mcfarland MD [facility]): 1/3/2023   Prior Hospitalization: See Medical History Provider #: [id number]   Prior Level of Function: Independent   Comorbidities: L TKA   Medications: Verified on Patient Summary List   Visits from [facility]: 7 Missed Visits: 0       Subjective:  Pt has met or is progressing toward their goals, they have improved functional mobility, and they are independent with their HEP. Objective:  Objective Measures:  LEFS Score: 56.2  FOTO: 65  30STS: 10 reps     ROM / Strength  [] Unable to assess                  AROM                      PROM                   Strength (1-5)      Left Right Left Right Left Right   Hip Flexion WNL WNL     5/5 5/5     Extension WNL WNL     5/5 5/5     Abduction WNL WNL     5/5 5/5     Adduction WNL WNL     5/5 5/5   Knee Flexion 119 WNL  125   5/5 5/5     Extension 0 0     5/5 5/5   Ankle Plantarflexion WNL WNL     5/5 5/5     Dorsiflexion WNL WNL     5/5 5/5        Goal Status Final:  Short Term Goals: 3 weeks  Patient will report the knowledge of 3 exercises that can be used to help reduce symptoms to be able to ind reduce symptoms while at home. -MET  Patient will demonstrate a 1/2 grade improvement in LLE MMT to be able to better ambulate with a normalized gait without pain. -MET  Patient will demonstrate L knee PROM of 0-115 deg to facilitate increased ability to ascend/descend stairs. -MET  Patient will increase LEFS > 10 points to facilitate increased ability to perform functional activities of choice.  -NOT MET: exit LEFS incomplete     Long Term Goals: 6 weeks  Patient will demonstrate a 2 grade improvement in LLE MMT to be able to better ambulate with a normalized gait without pain. -MET  Patient will demonstrate L knee AROM of 0-125  deg or greater to be able to return to normal ambulation on level and unlevel surfaces. -NOT MET: see chart above  Patient will demonstrate the ability to ambulate > 500 feet without an AD without evidence of antalgia to be able to return to an ind walking program following discharge. -NOT MET: to still exhibits antalgic walking pattern but pt attributes this to his R knee. 4.   Patient will increase LEFS > 20 points to facilitate increased ability to perform leisure activities of choice. -NOT MET: exit LEFS incomplete        Key Functional Changes/Progress: Pt has improved ROM, strength, and functional mobility. Assessments/Recommendations: Discontinue therapy. Progressing towards or have reached established goals. If you have any questions/comments please contact us directly at [phone]. Thank you for allowing us to assist in the care of your patient. Therapist Signature: Giovany Allison PT, DPT Date: 1/30/2023   Reporting Period: 1/03/23 - 1/30/23 Time: 10:39 AM          NOTE TO PHYSICIAN:  PLEASE COMPLETE THE ORDERS BELOW AND FAX TO   [facility]: [phone]. If you are unable to process this request in 24 hours please contact our office: [phone].    ___ I have read the above report and request that my patient be discharged from therapy.      Physician Signature:        Date:       Time:

## 2023-04-17 PROBLEM — M17.11 PRIMARY OSTEOARTHRITIS OF RIGHT KNEE: Chronic | Status: ACTIVE | Noted: 2023-04-17

## 2023-05-16 ENCOUNTER — HOSPITAL ENCOUNTER (OUTPATIENT)
Age: 63
Setting detail: RECURRING SERIES
Discharge: HOME | End: 2023-05-19
Payer: COMMERCIAL

## 2023-05-16 PROCEDURE — 97162 PT EVAL MOD COMPLEX 30 MIN: CPT

## 2023-05-16 PROCEDURE — 97530 THERAPEUTIC ACTIVITIES: CPT

## 2023-05-16 NOTE — THERAPY EVALUATION
PT KNEE EVAL AND DAILY NOTE 10-18    Patient Name: Kelsie Stone  Date:2023  : 1960  [x]  Patient  Verified  Payor: John Chamber / Plan: [employer]MO / Product Type: *No Product type* /    In time: 0815  Out time: 0900  Total Treatment Time (min): 45  Visit #: 1       Treatment Area: R TKA  SUBJECTIVE  Pain Level (0-10 scale):  Current: 3/10; At worts: 6/10  []Sharp  [x]Dull  [x]Achy []Burning []Throbbing []N&T []Other:  [x]constant []intermittent []improving []worsening []no change since onset    Any medication changes, allergies to medications, adverse drug reactions, diagnosis change, or new procedure performed?: [x] No    [] Yes (see summary sheet for update)  Subjective:     Pt is a 60 y/o male who presents to physical therapy s/p R TKA on 23. Pt received in waiting area ambulating with no AD and exhibiting an antalgic gait pattern of decreased gait speed, decreased L step length, and decreased duc. Prior to surgery pt was independent with mobility and used no AD for ambulation. Pt presents today with decreased ROM, decreased strength, increased pain, and decreased ability to ambulate with normalized gait pattern. Pt could benefit from skilled PT services to address the above impairments and to improve Pt ability to participate in functional activities of choice.       Date of Surgery: 23  Weight bearing status: WBAT    Patient Goals: Reduce pain, restore mobility  Previous Treatment/Compliance: na  Barriers: []pain []financial []time []transportation []other  Motivation: good  Substance use: []Alcohol []Tobacco []other:   LEFS Score: 48.2  FOTO: 59    Past Medical History:   Diagnosis Date    Arthritis     OA in knees- Dr. Wheat Minus    Exercise tolerance finding 2023    pt denies SOB/chest pain with ADLs    Hypertension     PCP- Dr. Chantel Bennett in [place]    Obstructive sleep apnea on CPAP     annual visit-[facility]    Wears glasses 2023    driving

## 2023-05-16 NOTE — THERAPY EVALUATION
[address] [facility] Yvan Schwarz, [address] [facility], [address] [place] [address]  GERARDO Amador [address]  [address]  FOR PHYSICAL THERAPY SERVICES  Patient Name: Britany Manuel : 1960   Medical   Diagnosis: R TKA Treatment Diagnosis: R26.2; M62.81   Onset Date:  23     Referral Source: Lindsay Cifuentes MD [facility]): 2023   Prior Hospitalization: See medical history Provider #: [id number]   Prior Level of Function:  Independent   Comorbidities:  Arthritis, HTN, LACY   Medications: Verified on Patient Summary List   The Plan of Care and following information is based on the information from the initial evaluation.   ==========================================================================================  Assessment / Functional Analysis:    Pt is a 60 y/o male who presents to physical therapy s/p R TKA on 23. Pt received in waiting area ambulating with no AD and exhibiting an antalgic gait pattern of decreased gait speed, decreased L step length, and decreased duc. Prior to surgery pt was independent with mobility and used no AD for ambulation. Pt presents today with decreased ROM, decreased strength, increased pain, and decreased ability to ambulate with normalized gait pattern.  Pt could benefit from skilled PT services to address the above impairments and to improve Pt ability to participate in functional activities of choice.      ==========================================================================================  Eval Complexity: History: MEDIUM  Complexity : 1-2 comorbidities / personal factors will impact the outcome/ POC Exam:LOW Complexity : 1-2 Standardized tests and measures addressing body structure, function, activity limitation and / or participation in recreation  Presentation: LOW Complexity : Stable, uncomplicated  Clinical Decision Making:MEDIUM Complexity : FOTO score of 26-74Overall Complexity:MEDIUM    Problem

## 2023-05-22 ENCOUNTER — HOSPITAL ENCOUNTER (OUTPATIENT)
Age: 63
Setting detail: RECURRING SERIES
Discharge: HOME | End: 2023-05-25
Payer: COMMERCIAL

## 2023-05-22 PROCEDURE — 97110 THERAPEUTIC EXERCISES: CPT

## 2023-05-22 NOTE — PROGRESS NOTES
PT DAILY TREATMENT NOTE 8    Patient Name: Ramos Bal  Date:2023  : 1960  [x]  Patient  Verified  Payor: Scott Middleton / Plan: [employer] HMO / Product Type: *No Product type* /    In time:800  Out time:905  Total Treatment Time (min): 65  Total Timed Codes (min): 65  1:1 Treatment Time (min): 65   Visit #: 2     Treatment Area: No admission diagnoses are documented for this encounter. SUBJECTIVE  Pt reported no new c/o today. States he is feeling pretty good. Pain Level (0-10 scale): 3    Any medication changes, allergies to medications, adverse drug reactions, diagnosis change, or new procedure performed?: [x] No    [] Yes (see summary sheet for update)        OBJECTIVE  Modality rationale: NA in clinic today.     Min Type Additional Details    [] Estim: []Att   []Unatt  []TENS instruct                 []IFC  []Premod []NMES                       []Other:  []w/US   []w/ice   []w/heat  Position:  Location:    []  Traction: [] Cervical       []Lumbar                       [] Prone          []Supine                       []Intermittent   []Continuous Lbs:  [] before manual  [] after manual    []  Ultrasound: []Continuous   [] Pulsed                           []1MHz   []3MHz Location:  W/cm2:    []  Iontophoresis with dexamethasone         Location: [] Take home patch   [] In clinic    []  Ice     []  heat  []  Ice massage Position:  Location:    []  Vasopneumatic Device Pressure: [] lo [] med [] hi   Temp: [] lo [] med [] hi   [] Skin assessment post-treatment:  []intact []redness- no adverse reaction       []redness - adverse reaction:           65 min Therapeutic Exercise:  [x] See flow sheet :   Rationale: increase ROM, increase strength, improve coordination, and improve balance to improve the patients ability to return to prior level of function before injury/illness with reduced pain, achieving optimal strength and function to perform household tasks, daily activities, and return to

## 2023-05-25 ENCOUNTER — HOSPITAL ENCOUNTER (OUTPATIENT)
Age: 63
Setting detail: RECURRING SERIES
Discharge: HOME | End: 2023-05-28
Payer: COMMERCIAL

## 2023-05-25 PROCEDURE — 97110 THERAPEUTIC EXERCISES: CPT

## 2023-05-30 ENCOUNTER — APPOINTMENT (OUTPATIENT)
Age: 63
End: 2023-05-30
Payer: COMMERCIAL

## 2023-05-30 ENCOUNTER — HOSPITAL ENCOUNTER (OUTPATIENT)
Age: 63
Setting detail: RECURRING SERIES
Discharge: HOME | End: 2023-06-02
Payer: COMMERCIAL

## 2023-05-30 PROCEDURE — 97110 THERAPEUTIC EXERCISES: CPT

## 2023-05-30 NOTE — PROGRESS NOTES
PT DAILY TREATMENT NOTE 8    Patient Name: Karel Muse  Date:2023  : 1960  [x]  Patient  Verified  Payor: Bran Burleson / Plan: [employer]MO / Product Type: *No Product type* /    In time:756  Out time:853  Total Treatment Time (min): 57  Total Timed Codes (min): 57  1:1 Treatment Time (min): 57  Visit #: 4    Treatment Area: No admission diagnoses are documented for this encounter. SUBJECTIVE  Pt reported no new c/o today. States he is feeling pretty good. Pain Level (0-10 scale): 3, unless bending fully the 5    Any medication changes, allergies to medications, adverse drug reactions, diagnosis change, or new procedure performed?: [x] No    [] Yes (see summary sheet for update)        OBJECTIVE  Modality rationale: NA in clinic today.     Min Type Additional Details    [] Estim: []Att   []Unatt  []TENS instruct                 []IFC  []Premod []NMES                       []Other:  []w/US   []w/ice   []w/heat  Position:  Location:    []  Traction: [] Cervical       []Lumbar                       [] Prone          []Supine                       []Intermittent   []Continuous Lbs:  [] before manual  [] after manual    []  Ultrasound: []Continuous   [] Pulsed                           []1MHz   []3MHz Location:  W/cm2:    []  Iontophoresis with dexamethasone         Location: [] Take home patch   [] In clinic    []  Ice     []  heat  []  Ice massage Position:  Location:    []  Vasopneumatic Device Pressure: [] lo [] med [] hi   Temp: [] lo [] med [] hi   [] Skin assessment post-treatment:  []intact []redness- no adverse reaction       []redness - adverse reaction:           57 min Therapeutic Exercise:  [x] See flow sheet :   Rationale: increase ROM, increase strength, improve coordination, and improve balance to improve the patients ability to return to prior level of function before injury/illness with reduced pain, achieving optimal strength and function to perform household tasks, daily

## 2023-06-05 ENCOUNTER — HOSPITAL ENCOUNTER (OUTPATIENT)
Age: 63
Setting detail: RECURRING SERIES
Discharge: HOME | End: 2023-06-08
Payer: COMMERCIAL

## 2023-06-05 PROCEDURE — 97110 THERAPEUTIC EXERCISES: CPT

## 2023-06-06 NOTE — PROGRESS NOTES
PT DAILY TREATMENT NOTE 8    Patient Name: Chris Morrison  Date:2023  : 1960  [x]  Patient  Verified  Payor: Sherrie Gutierrez / Plan: [employer]MO / Product Type: *No Product type* /    In time:758  Out time:902  Total Treatment Time (min): 64  Total Timed Codes (min): 64  1:1 Treatment Time (min): 64  Visit #: 5    Treatment Area: Presence of right artificial knee joint [Z96.651]    SUBJECTIVE  Pt reported no new c/o today. States he is feeling pretty good. Pain Level (0-10 scale): 2    Any medication changes, allergies to medications, adverse drug reactions, diagnosis change, or new procedure performed?: [x] No    [] Yes (see summary sheet for update)        OBJECTIVE  Modality rationale: NA in clinic today.     Min Type Additional Details    [] Estim: []Att   []Unatt  []TENS instruct                 []IFC  []Premod []NMES                       []Other:  []w/US   []w/ice   []w/heat  Position:  Location:    []  Traction: [] Cervical       []Lumbar                       [] Prone          []Supine                       []Intermittent   []Continuous Lbs:  [] before manual  [] after manual    []  Ultrasound: []Continuous   [] Pulsed                           []1MHz   []3MHz Location:  W/cm2:    []  Iontophoresis with dexamethasone         Location: [] Take home patch   [] In clinic    []  Ice     []  heat  []  Ice massage Position:  Location:    []  Vasopneumatic Device Pressure: [] lo [] med [] hi   Temp: [] lo [] med [] hi   [] Skin assessment post-treatment:  []intact []redness- no adverse reaction       []redness - adverse reaction:           64 min Therapeutic Exercise:  [x] See flow sheet :   Rationale: increase ROM, increase strength, improve coordination, and improve balance to improve the patients ability to return to prior level of function before injury/illness with reduced pain, achieving optimal strength and function to perform household tasks, daily activities, and return to community

## 2023-06-07 ENCOUNTER — APPOINTMENT (OUTPATIENT)
Age: 63
End: 2023-06-07
Payer: COMMERCIAL

## 2023-06-16 ENCOUNTER — HOSPITAL ENCOUNTER (OUTPATIENT)
Age: 63
Setting detail: RECURRING SERIES
Discharge: HOME | End: 2023-06-19
Payer: COMMERCIAL

## 2023-06-16 PROCEDURE — 97110 THERAPEUTIC EXERCISES: CPT

## 2023-06-16 PROCEDURE — 97164 PT RE-EVAL EST PLAN CARE: CPT
